# Patient Record
Sex: FEMALE | Race: OTHER | Employment: UNEMPLOYED | URBAN - METROPOLITAN AREA
[De-identification: names, ages, dates, MRNs, and addresses within clinical notes are randomized per-mention and may not be internally consistent; named-entity substitution may affect disease eponyms.]

---

## 2017-09-19 ENCOUNTER — HOSPITAL ENCOUNTER (INPATIENT)
Age: 32
LOS: 2 days | Discharge: HOME | DRG: 754 | End: 2017-09-21
Attending: EMERGENCY MEDICINE | Admitting: PSYCHIATRY & NEUROLOGY
Payer: MEDICAID

## 2017-09-19 ENCOUNTER — HOSPITAL ENCOUNTER (EMERGENCY)
Age: 32
Discharge: HOME | DRG: 754 | End: 2017-09-19
Attending: EMERGENCY MEDICINE
Payer: MEDICAID

## 2017-09-19 VITALS
RESPIRATION RATE: 14 BRPM | HEART RATE: 66 BPM | DIASTOLIC BLOOD PRESSURE: 77 MMHG | SYSTOLIC BLOOD PRESSURE: 124 MMHG | TEMPERATURE: 98.1 F | WEIGHT: 110 LBS | OXYGEN SATURATION: 95 % | BODY MASS INDEX: 17.68 KG/M2 | HEIGHT: 66 IN

## 2017-09-19 DIAGNOSIS — F11.24 OPIOID DEPENDENCE WITH OPIOID-INDUCED MOOD DISORDER (HCC): ICD-10-CM

## 2017-09-19 DIAGNOSIS — F11.20 HEROIN ADDICTION (HCC): Primary | ICD-10-CM

## 2017-09-19 DIAGNOSIS — R45.851 SUICIDAL IDEATION: Primary | ICD-10-CM

## 2017-09-19 PROCEDURE — 99285 EMERGENCY DEPT VISIT HI MDM: CPT

## 2017-09-19 PROCEDURE — 99284 EMERGENCY DEPT VISIT MOD MDM: CPT

## 2017-09-19 PROCEDURE — 1240000000 HC EMOTIONAL WELLNESS R&B

## 2017-09-19 ASSESSMENT — SLEEP AND FATIGUE QUESTIONNAIRES
DIFFICULTY STAYING ASLEEP: YES
RESTFUL SLEEP: NO
DIFFICULTY ARISING: NO
DIFFICULTY FALLING ASLEEP: YES
DO YOU HAVE DIFFICULTY SLEEPING: YES
DO YOU USE A SLEEP AID: NO
AVERAGE NUMBER OF SLEEP HOURS: 3
SLEEP PATTERN: INSOMNIA

## 2017-09-19 ASSESSMENT — LIFESTYLE VARIABLES: HISTORY_ALCOHOL_USE: NO

## 2017-09-19 NOTE — ED NOTES
Pt and rn spoke with [facility]  Pt would need to have active insurance to be admitted there . RN called [facility] pt can come severo 0800 for a bed. RN also called [facility] and they have a bed available right now for pt . RN offered pt this option and offered to cab pt to [facility] pt refused to go .  DAYRON Jose aware and will d/c pt to home      [facility]  09/19/17 9087

## 2017-09-19 NOTE — ED NOTES
[facility] called states no beds available for this pt , will call [facility].      Pranav Guillen RN  09/19/17 2302

## 2017-09-19 NOTE — IP AVS SNAPSHOT
After Visit Summary    This summary was created for you. Thank you for entrusting your care to us. The following information includes details about your hospital/visit stay along with steps you should take to help with your recovery once you leave the hospital.  In this packet, you will find information about the topics listed below:    · Instructions about your medications including a list of your home medications  · A summary of your hospital visit  · Follow-up appointments once you have left the hospital  · Your care plan at home      You may receive a survey regarding the care you received during your stay. Your input is valuable to us. We encourage you to complete and return your survey in the envelope provided. We hope you will choose us in the future for your healthcare needs. Basic Information     Date Of Birth Sex Race Ethnicity Preferred Language    1985 Female  / English      Vital Signs     Blood Pressure Pulse Temperature Respirations Height Weight    103/67 111 98.2 °F (36.8 °C) (Oral) 12 5' 6\" (1.676 m) 123 lb (55.8 kg)    Last Menstrual Period Oxygen Saturation Body Mass Index Smoking Status          08/19/2017 (Exact Date) 100% 19.85 kg/m2 Current Every Day Smoker        Care Provided at:     Name Address Phone       [address] [facility] [phone]       Psychiatric Advance Directive          Most Recent Value    Psychiatric Advance Directive  No    Power of  for Chato Montano  none           Your Visit    Here you will find information about your visit, including the reason for your visit. Please take this sheet with you when you visit your doctor or other health care provider in the future. It will help determine the best possible medical care for you at that time. If you have any questions once you leave the hospital, please call the department phone number listed below.         Why you were here surgery. Follow any nutrition recommendations given to you during your hospital stay. ? If you were given an oral nutrition supplement while in the hospital, continue to take this supplement at home. You can take it with meals, in-between meals, and/or before bedtime. These supplements can be purchased at most local grocery stores, pharmacies, and chain [employer]-stores. ? If you have any questions about your diet or nutrition, call the hospital and ask for the dietitian. General diet           Activity Instructions     As tolerated           Procedures and Other Instructions     Please keep all follow up appointments. Take all medications as prescribed. Avoid alcohol and streets drugs. Discharge Instructions            Suicidal Thoughts and Behavior: Care Instructions  Your Care Instructions  You have been seen by a doctor because you've had thoughts about killing yourself. Maybe you have tried to do it. This is much different from having fleeting thoughts of death, which many people have from time to time. Your doctor and support team will work hard to help keep you safe. Your team may include a , a , and a counselor. Most people who think about suicide don't want to die. They think suicide will end their problems and pain. People who consider suicide often feel hopeless, helpless, and worthless. These thoughts can make a person feel that there is no other choice. But you do have a choice. Help is always available. The doctor and staff members are taking you and your pain very seriously. It is important to remember that there are people who are willing and able to talk with you about your suicidal thoughts. Treatment and close follow-up care can help you feel better about life. Thoughts of hopelessness and suicide may come from being depressed. Depression is a medical condition.  When you have depression, there may be problems with activity levels in certain parts of your brain. Chemicals in your brain called neurotransmitters may be out of balance. But depression can be treated. Treatment for depression includes counseling, medicines, and lifestyle changes. With treatment, you can feel better. Your doctor doesn't want you to hurt yourself. He or she may ask you to sign a \"no harm\" agreement or contract. This contract is your promise that you will not hurt yourself between now and your next visit. Be completely honest with your doctor if you feel that you can't sign it. You will get help. Follow-up care is a key part of your treatment and safety. Be sure to make and go to all appointments, and call your doctor if you are having problems. It's also a good idea to know your test results and keep a list of the medicines you take. How can you care for yourself at home? · Talk to someone. Reach out to family members, friends, your doctor, or a counselor. Be open and honest with them about your thoughts and feelings. · Be safe with medicines. Take your medicines exactly as prescribed. Call your doctor if you think you are having a problem with your medicine. · Avoid illegal drugs and alcohol. · Attend all counseling sessions recommended by your doctor. · Have someone take away sharp or dangerous objects, guns, and drugs from your home. · Keep the numbers for these national suicide hotlines: 3-245-123-TALK (4-659.929.6347) and 0-053-LBOPULJ (1-740.174.9164). When should you call for help? Call 911 anytime you think you may need emergency care. For example, call if:  · You feel you cannot stop from hurting yourself or someone else. Call your doctor now or seek immediate medical care if:  · You have one or more warning signs of suicide. For example, call if:  ¨ You feel like giving away your possessions. ¨ You use illegal drugs or drink alcohol heavily. ¨ You talk or write about death.  This may include writing suicide notes ? Review your discharge instructions provided by your caregivers at discharge    Certain functionality such as prescription refills, scheduling appointments or sending messages to your provider are not activated if your provider does not use [facility] in his/her office    For questions regarding your [facility] account call [phone]. If you have a clinical question, please call your doctor's office.

## 2017-09-19 NOTE — ED NOTES
Pt here requesting  admission for heroine addiction .  Pt would like to get into a detox program . Pt has no other complaints     Jessica Mcintyre RN  09/19/17 3021

## 2017-09-19 NOTE — ED AVS SNAPSHOT
After Visit Summary  (Discharge Instructions)    Medication List for Home    Based on the information you provided to us as well as any changes during this visit, the following is your updated medication list.  Compare this with your prescription bottles at home. If you have any questions or concerns, contact your primary care physician's office. Daily Medication List (This medication list can be shared with any Healthcare provider who is helping you manage your medications)      Notice     You have not been prescribed any medications. Allergies as of 2017     No Known Allergies      Immunizations as of 2017     No immunizations on file. After Visit Summary    This summary was created for you. Thank you for entrusting your care to us. The following information includes details about your hospital/visit stay along with steps you should take to help with your recovery once you leave the hospital.  In this packet, you will find information about the topics listed below:    · Instructions about your medications including a list of your home medications  · A summary of your hospital visit  · Follow-up appointments once you have left the hospital  · Your care plan at home      You may receive a survey regarding the care you received during your stay. Your input is valuable to us. We encourage you to complete and return your survey in the envelope provided. We hope you will choose us in the future for your healthcare needs. Patient Information     Patient Name СВЕТЛАНА Bentley       Care Provided at:     Name Address Phone       [address]  [address] [phone]            Your Visit    Here you will find information about your visit, including the reason for your visit.   Please take this sheet with you when you visit your doctor or 2. Click on the Sign Up Now link in the Sign In box. You will see the New Member Sign Up page. 3. Enter your [employer] Access Code exactly as it appears below. You will not need to use this code after youve completed the sign-up process. If you do not sign up before the expiration date, you must request a new code. [employer] Access Code: [email or web address]  Expires: 11/18/2017  4:06 PM    4. Enter your Social Security Number ([id number]) and Date of Birth (mm/dd/yyyy) as indicated and click Submit. You will be taken to the next sign-up page. 5. Create a [employer] ID. This will be your [employer] login ID and cannot be changed, so think of one that is secure and easy to remember. 6. Create a [employer] password. You can change your password at any time. 7. Enter your Password Reset Question and Answer. This can be used at a later time if you forget your password. 8. Enter your e-mail address. You will receive e-mail notification when new information is available in [address] 9. Click Sign Up. You can now view your medical record. Additional Information  If you have questions, please contact the physician practice where you receive care. Remember, [employer] is NOT to be used for urgent needs. For medical emergencies, dial 911. For questions regarding your [employer] account call [phone]. If you have a clinical question, please call your doctor's office. View your information online  ? Review your current list of  medications, immunization, and allergies. ? Review your future test results online . ? Review your discharge instructions provided by your caregivers at discharge    Certain functionality such as prescription refills, scheduling appointments or sending messages to your provider are not activated if your provider does not use [employer] in his/her office    For questions regarding your [employer] account call [phone]. If you have a clinical question, please call your doctor's office. The information on all pages of the After Visit Summary has been reviewed with me, the patient and/or responsible adult, by my health care provider(s). I had the opportunity to ask questions regarding this information. I understand I should dispose of my armband safely at home to protect my health information. A complete copy of the After Visit Summary has been given to me, the patient and/or responsible adult. Patient Signature/Responsible Adult: ___________________________________    Nurse Signature: ___________________________________  Resident/MLP Signature: ___________________________________  Attending Signature: ___________________________________    Date:____________Time:____________              Discharge Instructions            Learning About Heroin Use and Withdrawal  What is heroin use? Heroin is an illegal, highly addictive drug. It's an opioid, like some types of medicines that doctors prescribe to treat pain. Examples of prescribed opioids include hydrocodone, oxycodone, fentanyl, and morphine. But while a prescribed opioid has rules for legal use, heroin does not. Heroin that is sold on the street has no . The strength of each dose is not known. It is often mixed (cut) with other drugs, sugar, powdered milk, or other things. It may also be cut with poisons, such as strychnine. Often, heroin use starts with the casual misuse of a prescribed opioid. A person may then switch to heroin because of its lower cost, in spite of the greater danger of using it. A person who uses heroin often will start needing higher and higher doses of the drug to get the same effect. This is called tolerance. Using the drug often also means it will take more of the drug for the body to function and to feel normal. This is called physical dependence. A person who uses heroin daily can become dependent on it within a few weeks. Taking too much heroin can be dangerous.  An overdose may cause trouble heroin. Counseling can also help the person's friends and family. It can provide support and teach them how to give the help that the person needs. Follow-up care is a key part of your treatment and safety. Be sure to make and go to all appointments, and call your doctor if you are having problems. It's also a good idea to know your test results and keep a list of the medicines you take. Where can you learn more? Go to https://[employer].peyajairaeweb.LearnBoost. org and sign in to your [employer] account. Enter P277 in the [employer] box to learn more about \"Learning About Heroin Use and Withdrawal.\"     If you do not have an account, please click on the \"Sign Up Now\" link. Current as of: March 23, 2017  Content Version: 11.3  © 4831-3801 [employer]. Care instructions adapted under license by [facility] ([facility]). If you have questions about a medical condition or this instruction, always ask your healthcare professional. [place] [address] any warranty or liability for your use of this information.

## 2017-09-19 NOTE — ED PROVIDER NOTES
[facility] ED  Emergency Department  Independent Attestation     Pt Name: Nirav Mack  MRN: [id number]  [place] 1985  Date of evaluation: 9/74/98       Nirav Mack is a 36 y.o. female who presents with Addiction Problem      I was personally available for consultation in the Emergency Department. I have reviewed the chart and agree with the documentation as recorded by the [facility], including the assessment, treatment plan and disposition.     Orquidea Koenig DO  Attending Emergency Physician  [facility] ED      (Please note that portions of this note were completed with a voice recognition program.  Efforts were made to edit the dictations but occasionally words are mis-transcribed.)        Orquidea Koenig DO  09/19/17 1873
kg/m2  Constitutional:  Well developed   Eyes:  Pupils equal and readily reactive to light  HENT:  Atraumatic, external ears normal, nose normal, oropharynx moist. Neck- supple. Respiratory:  Clear to auscultation bilaterally with good air exchange, no W/R/R  Cardiovascular:  RRR with normal S1 and S2  Gastrointestinal/Abdomen:  Soft, NT.  BS present. Musculoskeletal:  No edema, no tenderness, no deformities. Back:  Normal ROM. Integument:  No rash. Neurologic:  Alert & oriented x 3, no focal deficits noted   Psychiatric:   Admits to heroin addiction, denies suicidal or homicidal ideation      DIAGNOSTIC RESULTS     EKG: All EKG's are interpreted by the Emergency Department Physician who either signs or Co-signs this chart in the absence of a cardiologist.  Not indicated    RADIOLOGY:   Reviewed the radiologist:  No orders to display     Not indicated      LABS:  Labs Reviewed - No data to display  Not indicated    EMERGENCY DEPARTMENT COURSE:   -------------------------  Nurse contacted multiple facilities, spent over 2 hours to get patient admitted to a rehab facility. After landing 3 areas that we'll admit her 1 that will take her immediately patient's left without discharge paperwork, states that she just wants Suboxone and walks out. No orders of the defined types were placed in this encounter. CONSULTS:  None      FINAL IMPRESSION      1. Heroin addiction [facility])          DISPOSITION/PLAN:  DISPOSITION Decision to Discharge      PATIENT REFERRED TO:  Loki [address].  [phone]  Go to      [address] [place] [id number]  [phone]    For worsening symptoms, or any other concern      DISCHARGE MEDICATIONS:  There are no discharge medications for this patient.       (Please note that portions of this note were completed with a voice recognition program.  Efforts were made to edit the dictations but occasionally words are

## 2017-09-20 LAB
ABSOLUTE EOS #: 0.1 K/UL (ref 0–0.4)
ABSOLUTE LYMPH #: 2.8 K/UL (ref 1–4.8)
ABSOLUTE MONO #: 0.5 K/UL (ref 0.1–1.3)
ALBUMIN SERPL-MCNC: 3.7 G/DL (ref 3.5–5.2)
ALBUMIN/GLOBULIN RATIO: ABNORMAL (ref 1–2.5)
ALP BLD-CCNC: 242 U/L (ref 35–104)
ALT SERPL-CCNC: 115 U/L (ref 5–33)
AMPHETAMINE SCREEN URINE: NEGATIVE
ANION GAP SERPL CALCULATED.3IONS-SCNC: 9 MMOL/L (ref 9–17)
AST SERPL-CCNC: 153 U/L
BARBITURATE SCREEN URINE: NEGATIVE
BASOPHILS # BLD: 1 %
BASOPHILS ABSOLUTE: 0 K/UL (ref 0–0.2)
BENZODIAZEPINE SCREEN, URINE: NEGATIVE
BILIRUB SERPL-MCNC: 0.24 MG/DL (ref 0.3–1.2)
BILIRUBIN URINE: NEGATIVE
BUN BLDV-MCNC: 11 MG/DL (ref 6–20)
BUN/CREAT BLD: ABNORMAL (ref 9–20)
BUPRENORPHINE URINE: ABNORMAL
CALCIUM SERPL-MCNC: 9 MG/DL (ref 8.6–10.4)
CANNABINOID SCREEN URINE: NEGATIVE
CHLORIDE BLD-SCNC: 103 MMOL/L (ref 98–107)
CO2: 27 MMOL/L (ref 20–31)
COCAINE METABOLITE, URINE: NEGATIVE
COLOR: YELLOW
COMMENT UA: NORMAL
CREAT SERPL-MCNC: 0.61 MG/DL (ref 0.5–0.9)
DIFFERENTIAL TYPE: NORMAL
EOSINOPHILS RELATIVE PERCENT: 2 %
GFR AFRICAN AMERICAN: >60 ML/MIN
GFR NON-AFRICAN AMERICAN: >60 ML/MIN
GFR SERPL CREATININE-BSD FRML MDRD: ABNORMAL ML/MIN/{1.73_M2}
GFR SERPL CREATININE-BSD FRML MDRD: ABNORMAL ML/MIN/{1.73_M2}
GLUCOSE BLD-MCNC: 99 MG/DL (ref 70–99)
GLUCOSE URINE: NEGATIVE
HCT VFR BLD CALC: 36.5 % (ref 36–46)
HEMOGLOBIN: 12.4 G/DL (ref 12–16)
KETONES, URINE: NEGATIVE
LEUKOCYTE ESTERASE, URINE: NEGATIVE
LYMPHOCYTES # BLD: 42 %
MCH RBC QN AUTO: 29.5 PG (ref 26–34)
MCHC RBC AUTO-ENTMCNC: 33.9 G/DL (ref 31–37)
MCV RBC AUTO: 86.8 FL (ref 80–100)
MDMA URINE: ABNORMAL
METHADONE SCREEN, URINE: NEGATIVE
METHAMPHETAMINE, URINE: ABNORMAL
MONOCYTES # BLD: 8 %
NITRITE, URINE: NEGATIVE
OPIATES, URINE: POSITIVE
OXYCODONE SCREEN URINE: NEGATIVE
PDW BLD-RTO: 13.5 % (ref 11.5–14.9)
PH UA: 7.5 (ref 5–8)
PHENCYCLIDINE, URINE: NEGATIVE
PLATELET # BLD: 269 K/UL (ref 150–450)
PLATELET ESTIMATE: NORMAL
PMV BLD AUTO: 7.1 FL (ref 6–12)
POTASSIUM SERPL-SCNC: 4.2 MMOL/L (ref 3.7–5.3)
PROPOXYPHENE, URINE: ABNORMAL
PROTEIN UA: NEGATIVE
RBC # BLD: 4.2 M/UL (ref 4–5.2)
RBC # BLD: NORMAL 10*6/UL
SEG NEUTROPHILS: 47 %
SEGMENTED NEUTROPHILS ABSOLUTE COUNT: 3.1 K/UL (ref 1.3–9.1)
SODIUM BLD-SCNC: 139 MMOL/L (ref 135–144)
SPECIFIC GRAVITY UA: 1.01 (ref 1–1.03)
TEST INFORMATION: ABNORMAL
TOTAL PROTEIN: 7.9 G/DL (ref 6.4–8.3)
TRICYCLIC ANTIDEPRESSANTS, UR: ABNORMAL
TURBIDITY: CLEAR
URINE HGB: NEGATIVE
UROBILINOGEN, URINE: NORMAL
WBC # BLD: 6.6 K/UL (ref 3.5–11)
WBC # BLD: NORMAL 10*3/UL

## 2017-09-20 PROCEDURE — 85025 COMPLETE CBC W/AUTO DIFF WBC: CPT

## 2017-09-20 PROCEDURE — 6370000000 HC RX 637 (ALT 250 FOR IP): Performed by: PSYCHIATRY & NEUROLOGY

## 2017-09-20 PROCEDURE — 80053 COMPREHEN METABOLIC PANEL: CPT

## 2017-09-20 PROCEDURE — 80307 DRUG TEST PRSMV CHEM ANLYZR: CPT

## 2017-09-20 PROCEDURE — 81003 URINALYSIS AUTO W/O SCOPE: CPT

## 2017-09-20 PROCEDURE — 1240000000 HC EMOTIONAL WELLNESS R&B

## 2017-09-20 PROCEDURE — 36415 COLL VENOUS BLD VENIPUNCTURE: CPT

## 2017-09-20 PROCEDURE — 6360000002 HC RX W HCPCS: Performed by: PSYCHIATRY & NEUROLOGY

## 2017-09-20 RX ORDER — PROMETHAZINE HYDROCHLORIDE 25 MG/1
25 TABLET ORAL EVERY 4 HOURS PRN
Status: DISCONTINUED | OUTPATIENT
Start: 2017-09-20 | End: 2017-09-21 | Stop reason: HOSPADM

## 2017-09-20 RX ORDER — PROMETHAZINE HYDROCHLORIDE 25 MG/ML
6.25 INJECTION, SOLUTION INTRAMUSCULAR; INTRAVENOUS EVERY 4 HOURS PRN
Status: DISCONTINUED | OUTPATIENT
Start: 2017-09-20 | End: 2017-09-20

## 2017-09-20 RX ORDER — DIPHENHYDRAMINE HCL 25 MG
25 TABLET ORAL NIGHTLY PRN
Status: DISCONTINUED | OUTPATIENT
Start: 2017-09-20 | End: 2017-09-21 | Stop reason: HOSPADM

## 2017-09-20 RX ORDER — PROMETHAZINE HYDROCHLORIDE 25 MG/1
25 TABLET ORAL EVERY 4 HOURS PRN
Status: DISCONTINUED | OUTPATIENT
Start: 2017-09-20 | End: 2017-09-20

## 2017-09-20 RX ORDER — DICYCLOMINE HCL 20 MG
20 TABLET ORAL 4 TIMES DAILY PRN
Status: DISCONTINUED | OUTPATIENT
Start: 2017-09-20 | End: 2017-09-21 | Stop reason: HOSPADM

## 2017-09-20 RX ORDER — ACETAMINOPHEN 325 MG/1
650 TABLET ORAL EVERY 4 HOURS PRN
Status: DISCONTINUED | OUTPATIENT
Start: 2017-09-20 | End: 2017-09-21 | Stop reason: HOSPADM

## 2017-09-20 RX ORDER — IBUPROFEN 800 MG/1
800 TABLET ORAL 3 TIMES DAILY PRN
Status: DISCONTINUED | OUTPATIENT
Start: 2017-09-20 | End: 2017-09-21 | Stop reason: HOSPADM

## 2017-09-20 RX ORDER — TRAZODONE HYDROCHLORIDE 50 MG/1
50 TABLET ORAL NIGHTLY PRN
Status: DISCONTINUED | OUTPATIENT
Start: 2017-09-20 | End: 2017-09-21 | Stop reason: HOSPADM

## 2017-09-20 RX ORDER — CYCLOBENZAPRINE HCL 10 MG
10 TABLET ORAL 3 TIMES DAILY PRN
Status: DISCONTINUED | OUTPATIENT
Start: 2017-09-20 | End: 2017-09-21 | Stop reason: HOSPADM

## 2017-09-20 RX ORDER — PROMETHAZINE HYDROCHLORIDE 25 MG/ML
12.5 INJECTION, SOLUTION INTRAMUSCULAR; INTRAVENOUS EVERY 4 HOURS PRN
Status: DISCONTINUED | OUTPATIENT
Start: 2017-09-20 | End: 2017-09-21 | Stop reason: HOSPADM

## 2017-09-20 RX ORDER — MAGNESIUM HYDROXIDE/ALUMINUM HYDROXICE/SIMETHICONE 120; 1200; 1200 MG/30ML; MG/30ML; MG/30ML
20 SUSPENSION ORAL 3 TIMES DAILY PRN
Status: DISCONTINUED | OUTPATIENT
Start: 2017-09-20 | End: 2017-09-21 | Stop reason: HOSPADM

## 2017-09-20 RX ORDER — CLONIDINE HYDROCHLORIDE 0.1 MG/1
0.1 TABLET ORAL 3 TIMES DAILY
Status: DISCONTINUED | OUTPATIENT
Start: 2017-09-20 | End: 2017-09-21 | Stop reason: HOSPADM

## 2017-09-20 RX ORDER — BENZTROPINE MESYLATE 1 MG/ML
2 INJECTION INTRAMUSCULAR; INTRAVENOUS DAILY PRN
Status: DISCONTINUED | OUTPATIENT
Start: 2017-09-20 | End: 2017-09-21 | Stop reason: HOSPADM

## 2017-09-20 RX ORDER — HYDROXYZINE HYDROCHLORIDE 25 MG/1
25 TABLET, FILM COATED ORAL 3 TIMES DAILY PRN
Status: DISCONTINUED | OUTPATIENT
Start: 2017-09-20 | End: 2017-09-21 | Stop reason: HOSPADM

## 2017-09-20 RX ORDER — LOPERAMIDE HYDROCHLORIDE 2 MG/1
2 CAPSULE ORAL 4 TIMES DAILY PRN
Status: DISCONTINUED | OUTPATIENT
Start: 2017-09-20 | End: 2017-09-21 | Stop reason: HOSPADM

## 2017-09-20 RX ADMIN — PROMETHAZINE HYDROCHLORIDE 25 MG: 25 TABLET ORAL at 21:05

## 2017-09-20 RX ADMIN — NICOTINE POLACRILEX 2 MG: 2 GUM, CHEWING BUCCAL at 20:28

## 2017-09-20 RX ADMIN — CYCLOBENZAPRINE HYDROCHLORIDE 10 MG: 10 TABLET, FILM COATED ORAL at 16:17

## 2017-09-20 RX ADMIN — TRAZODONE HYDROCHLORIDE 50 MG: 50 TABLET ORAL at 21:05

## 2017-09-20 RX ADMIN — CLONIDINE HYDROCHLORIDE 0.1 MG: 0.1 TABLET ORAL at 21:06

## 2017-09-20 RX ADMIN — CLONIDINE HYDROCHLORIDE 0.1 MG: 0.1 TABLET ORAL at 08:58

## 2017-09-20 RX ADMIN — ACETAMINOPHEN 650 MG: 325 TABLET ORAL at 19:25

## 2017-09-20 RX ADMIN — LOPERAMIDE HYDROCHLORIDE 2 MG: 2 CAPSULE ORAL at 21:06

## 2017-09-20 RX ADMIN — CYCLOBENZAPRINE HYDROCHLORIDE 10 MG: 10 TABLET, FILM COATED ORAL at 08:58

## 2017-09-20 RX ADMIN — IBUPROFEN 800 MG: 800 TABLET, FILM COATED ORAL at 21:05

## 2017-09-20 RX ADMIN — IBUPROFEN 800 MG: 800 TABLET, FILM COATED ORAL at 13:19

## 2017-09-20 RX ADMIN — DIPHENHYDRAMINE HCL 25 MG: 25 TABLET ORAL at 21:06

## 2017-09-20 RX ADMIN — HYDROXYZINE HYDROCHLORIDE 25 MG: 25 TABLET, FILM COATED ORAL at 21:06

## 2017-09-20 RX ADMIN — CYCLOBENZAPRINE HYDROCHLORIDE 10 MG: 10 TABLET, FILM COATED ORAL at 21:06

## 2017-09-20 RX ADMIN — DICYCLOMINE HYDROCHLORIDE 20 MG: 20 TABLET ORAL at 21:05

## 2017-09-20 RX ADMIN — CLONIDINE HYDROCHLORIDE 0.1 MG: 0.1 TABLET ORAL at 13:19

## 2017-09-20 ASSESSMENT — SLEEP AND FATIGUE QUESTIONNAIRES
DIFFICULTY STAYING ASLEEP: YES
AVERAGE NUMBER OF SLEEP HOURS: 3
DIFFICULTY ARISING: NO
DO YOU USE A SLEEP AID: NO
DIFFICULTY STAYING ASLEEP: YES
RESTFUL SLEEP: NO
DO YOU HAVE DIFFICULTY SLEEPING: YES
SLEEP PATTERN: INSOMNIA;NIGHTMARES/TERRORS
DIFFICULTY FALLING ASLEEP: YES
DIFFICULTY ARISING: YES
AVERAGE NUMBER OF SLEEP HOURS: 3
SLEEP PATTERN: DIFFICULTY FALLING ASLEEP;INSOMNIA;DISTURBED/INTERRUPTED SLEEP;RESTLESSNESS
RESTFUL SLEEP: NO
DIFFICULTY FALLING ASLEEP: YES
DO YOU USE A SLEEP AID: NO
DO YOU HAVE DIFFICULTY SLEEPING: YES

## 2017-09-20 ASSESSMENT — ENCOUNTER SYMPTOMS
ABDOMINAL PAIN: 0
SHORTNESS OF BREATH: 0

## 2017-09-20 ASSESSMENT — LIFESTYLE VARIABLES
HISTORY_ALCOHOL_USE: NO
HISTORY_ALCOHOL_USE: NO

## 2017-09-20 ASSESSMENT — PATIENT HEALTH QUESTIONNAIRE - PHQ9
SUM OF ALL RESPONSES TO PHQ QUESTIONS 1-9: 15
SUM OF ALL RESPONSES TO PHQ QUESTIONS 1-9: 18

## 2017-09-20 ASSESSMENT — PAIN SCALES - GENERAL
PAINLEVEL_OUTOF10: 9
PAINLEVEL_OUTOF10: 8
PAINLEVEL_OUTOF10: 10
PAINLEVEL_OUTOF10: 8

## 2017-09-20 ASSESSMENT — PAIN DESCRIPTION - LOCATION: LOCATION: LEG

## 2017-09-20 NOTE — PLAN OF CARE
Problem: Altered Mood, Depressive Behavior  Goal: STG-Absence of Self Harm  Outcome: Ongoing  Patient admits to feelings of anxiety related to everything going on outside of the hospital with her kids and trying to get clean. Patient expressed wanting to be moved to a rehab facility so she can focus on getting sober and staying clean. Patient denies any feelings of depression currently, homicidal/suicidal ideations or audio/visual hallucinations.  Spontaneous safety and 15 minute checks maintained

## 2017-09-20 NOTE — PLAN OF CARE
Problem: Altered Mood, Depressive Behavior  Goal: LTG-Able to verbalize and/or display a decrease in depressive symptoms  Outcome: Ongoing  Patient admits to feelings of anxiety related to everything going on outside of the hospital with her kids and trying to get clean. Patient expressed wanting to be moved to a rehab facility so she can focus on getting sober and staying clean. Patient denies any feelings of depression currently, homicidal/suicidal ideations or audio/visual hallucinations.  Spontaneous safety and 15 minute checks maintained

## 2017-09-20 NOTE — PLAN OF CARE
Problem: Altered Mood, Depressive Behavior  Goal: LTG-Able to verbalize and/or display a decrease in depressive symptoms  Outcome: Ongoing  Psychoeducation Group Note     Date: 9/20/17              Start Time: 1000                    End Time: 1045     Number Participants in Group:  5     Goal:  Patient will demonstrate increased interpersonal interaction. Topic:  Self assessment     Discipline Responsible:    OT   AT   [facility]. X RT   Other         Participation Level:                  None x Minimal   x Active Listener   Interactive     Monopolizing             Participation Quality:  x Appropriate   Inappropriate   x       Attentive         Intrusive           Sharing         Resistant           Supportive         Lethargic         Affective:           Congruent   Incongruent   Blunted   Flat   x Constricted x Anxious   Elated   Angry     Labile   Depressed   Other   Bright         Cognitive:  x Alert x Oriented PPTP       Concentration   G x F   P   Attention Span   G x F   P   Short-Term Memory   G   F   P   Long-Term Memory   G   F   P   ProblemSolving/  Decision Making   G x F   P   Ability to Process  Information   G x F   P         Contributing Factors              Delusional              Hallucinating              Flight of Ideas              Other:         Modes of Intervention:  x Education   Support   Exploration   x Clarifying x Problem Solving x Confrontation   x Socialization   Limit Setting   Reality Testing     Activity   Movement   Media     Other:                  Response to Learning:  x Able to verbalize current knowledge/experience   x Able to verbalize/acknowledge new learning     Able to retain information     Capable of insight   x Able to change behavior   x Progressing to goal     Other:          Comments: pt attended group and participated.
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
## 2017-09-20 NOTE — BH NOTE
Patient was asked to provide a urine sample at their earliest convenience. Patient was provided urine cup at this time.

## 2017-09-20 NOTE — ED NOTES
Pt arrives to ED c/o suicidal thoughts. Pt states she has had these thoughts intermittent for the past 3 months. Pt currently says she is unsure if she has a plan or not. Pt states she uses 2 grams of IV heroin daily, if not more. Pt states her last use was early this morning/last last night.       Kendal Villalobos RN  09/19/17 6734

## 2017-09-20 NOTE — ED PROVIDER NOTES
[facility]  eMERGENCY dEPARTMENT eNCOUnter      Pt Name: Frank Frias  MRN: [id number]  [place] 1985  Date of evaluation: 9/19/17      CHIEF COMPLAINT       Chief Complaint   Patient presents with    Suicidal     HISTORY OF PRESENT ILLNESS   HPI 36 y.o. female presents with complaints of wanting to kill her self. The patient states that she's been thinking about killing herself because she is depressed about her persistent drug abuse she says she's been using heroin for the last 6 years and other narcotics for 10 years total.  She states that she has a history of previous suicide attempt and then she's been thinking about overdosing on pills for the last 2 months. She has a history of PTSD and anxiety in addition to her drug abuse. REVIEW OF SYSTEMS       Review of Systems   Constitutional: Positive for activity change and appetite change. Negative for chills and fever. HENT: Negative for congestion and mouth sores. Eyes: Negative for visual disturbance. Respiratory: Negative for shortness of breath. Cardiovascular: Negative for chest pain. Gastrointestinal: Negative for abdominal pain. Endocrine: Negative for polyuria. Genitourinary: Negative for dysuria and vaginal bleeding. Musculoskeletal: Negative for joint swelling. Skin: Negative for rash. Allergic/Immunologic: Negative for immunocompromised state. Neurological: Negative for headaches. Psychiatric/Behavioral: Positive for agitation, decreased concentration, dysphoric mood, self-injury, sleep disturbance and suicidal ideas. Negative for hallucinations. PAST MEDICAL HISTORY   History reviewed. No pertinent past medical history. SURGICAL HISTORY     History reviewed. No pertinent surgical history. CURRENT MEDICATIONS       There are no discharge medications for this patient. ALLERGIES     has No Known Allergies. FAMILY HISTORY     has no family status information on file.       SOCIAL HISTORY      reports that she has been smoking Cigarettes. She has a 10.00 pack-year smoking history. She has never used smokeless tobacco. She reports that she uses illicit drugs, including Opiates . She reports that she does not drink alcohol. PHYSICAL EXAM     INITIAL VITALS: /73  Pulse 90  Temp 97.8 °F (36.6 °C) (Oral)   Resp 18  Ht 5' 6\" (1.676 m)  Wt 123 lb (55.8 kg)  LMP 08/19/2017 (Exact Date)  SpO2 100%  BMI 19.85 kg/m2  GeN: NAD  Head: Normocephalic, atraumatic  Eye: Pupils equal round reactive to light, no conjunctivitis  Heart: Regular rate and rhythm no murmurs  Lungs: Clear to auscultation bilaterally, no respiratory distress  Chest wall: No crepitus, no tenderness palpation  Abdomen: Soft, nontender, nondistended, with no peritoneal signs  Neurologic: Patient is alert and oriented x3, motor and sensation is intact in all 4 extremities, cerebellar function is normal  Extremities: Needle injection sites to bilateral forearms, no induration or erythema or tenderness. MEDICAL DECISION MAKING:     MDM 29-year-old female presenting with depression and thoughts of suicide and a plan to overdose on pills. No evidence of any intoxication or acute active toxidrome  Patient's medically clear for psychiatric evaluation, the  will evaluate her discussed with the attending psychiatrist and I anticipate inpatient admission for treatment of her depression opiate dependence and suicidal thoughts.       DIAGNOSTIC RESULTS     EMERGENCY DEPARTMENT COURSE:   Vitals:    Vitals:    09/19/17 2102 09/20/17 0153   BP: 117/74 103/73   Pulse: 97 90   Resp: 17 18   Temp: 98 °F (36.7 °C) 97.8 °F (36.6 °C)   TempSrc: Oral Oral   SpO2: 98% 100%   Weight: 110 lb (49.9 kg) 123 lb (55.8 kg)   Height:  5' 6\" (1.676 m)       The patient was given the following medications while in the emergency department:  Orders Placed This Encounter   Medications    acetaminophen (TYLENOL) tablet 650 mg    magnesium hydroxide (MILK OF MAGNESIA) 400 MG/5ML suspension 30 mL    traZODone (DESYREL) tablet 50 mg    aluminum & magnesium hydroxide-simethicone (MAALOX) 200-200-20 MG/5ML suspension 20 mL    nicotine polacrilex (NICORETTE) gum 2 mg    benztropine mesylate (COGENTIN) injection 2 mg    hydrOXYzine (ATARAX) tablet 25 mg       -------------------------    Procedures     FINAL IMPRESSION      1. Suicidal ideation    2. Opioid dependence with opioid-induced mood disorder [facility])          DISPOSITION/PLAN   DISPOSITION Admitted    PATIENT REFERRED TO:  Angelina Wynn MD  [address]  [phone]            DISCHARGE MEDICATIONS:  There are no discharge medications for this patient.         Florence Narayanan MD  Attending Emergency Physician                      Florence Narayanan MD  09/20/17 0272

## 2017-09-20 NOTE — PROGRESS NOTES
Nutrition Assessment    Type and Reason for Visit: Positive Nutrition Screen (Unplanned weight loss and decreased appetite. Dietitian consult needed for poor appetite and weight loss)    Nutrition Recommendations: Continue General diet. Patient declined ONS at this time. Malnutrition Assessment:  · Malnutrition Status: Mild Malnutrition  · Context: Chronic illness  · Findings of the 6 clinical characteristics of malnutrition (Minimum of 2 out of 6 clinical characteristics is required to make the diagnosis of moderate or severe Protein Calorie Malnutrition based on AND/ASPEN Guidelines):  1. Energy Intake-Less than or equal to 50%, greater than 7 days    2. Weight Loss-7.5% loss or greater, unable to assess    Nutrition Diagnosis:   · Problem: Inadequate oral intake  · Etiology: related to Psychological cause/life stress (substance abuse)     Signs and symptoms:  as evidenced by Weight loss, Diet history of poor intake, Lab values    Nutrition Assessment:  · Subjective Assessment: Patient reports weight loss is from drug use. Patient states that her ususal weight fluctuates from 135-140 lbs. Patient reports she ate a few bites at breakfast this morning but she is starting to have withdrawal symptoms of diarrhea, nausea and abdominal cramping. Patient declined Ensure at this time because of nausea.    · Nutrition-Focused Physical Findings: diarrhea, abdominal cramping, nausea  · Wound Type: None  · Current Nutrition Therapies:  · Oral Diet Orders: General   · Oral Diet intake: 1-25%  · Anthropometric Measures:  · Ht: 5' 6\" (167.6 cm)   · Current Body Wt: 123 lb (55.8 kg)  · Admission Body Wt: 123 lb (55.8 kg)  · Usual Body Wt: 135 lb (61.2 kg)  · % Weight Change: 9.7%,  unknown  · Ideal Body Wt: 130 lb (59 kg), % Ideal Body 95%  · Adjusted Body Wt:  , body weight adjusted for    · BMI Classification: BMI 18.5 - 24.9 Normal Weight  · Comparative Standards (Estimated Nutrition Needs):  · Estimated Daily Total Kcal: 5910-8725  · Estimated Daily Protein (g): 47-59    Estimated Intake vs Estimated Needs: Intake Less Than Needs    Nutrition Risk Level: High    Nutrition Interventions:   Continue current diet  Continued Inpatient Monitoring    Nutrition Evaluation:   · Evaluation: Goals set   · Goals: PO intakes greater than 75% at meals    · Monitoring: Meal Intake, Weight, Diet Tolerance, Pertinent Labs, Nausea or Vomiting, Diarrhea    See Adult Nutrition Doc Flowsheet for more detail.      Bella SINGH RTalaD, L.D,  Clinical Dietitian  Pager # [phone]

## 2017-09-20 NOTE — BH NOTE
`[facility]  Admission Note     Admission Type:   Admission Type: Voluntary    Reason for admission:  Reason for Admission: SI to OD for the last 2 months. Daily heroin user. Lost custody of children. PATIENT STRENGTHS:  Strengths: Communication, Positive Support    Patient Strengths and Limitations:  Limitations: Inappropriate/potentially harmful leisure interests, Difficulty problem solving/relies on others to help solve problems    Addictive Behavior:   Addictive Behavior  In the past 3 months, have you felt or has someone told you that you have a problem with:  : Other(Comments) (heroin use)  Do you have a history of Chemical Use?: No  Do you have a history of Alcohol Use?: No  Do you have a history of Street Drug Abuse?: Yes  Histroy of Prescripton Drug Abuse?: No    Medical Problems:   History reviewed. No pertinent past medical history. Status EXAM:  Status and Exam  Normal: No  Facial Expression: Flat, Sad  Affect: Appropriate  Level of Consciousness: Alert  Mood:Normal: No  Mood: Depressed, Anxious, Worthless, low self-esteem, Helpless  Motor Activity:Normal: Yes  Interview Behavior: Cooperative  Preception: [place] to Person, [place]lette Dasen to Time, [place] to Place, [place] to Situation  Attention:Normal: Yes  Thought Processes: Circumstantial  Thought Content:Normal: Yes  Hallucinations: None  Delusions: No  Memory:Normal: Yes  Insight and Judgment: No  Insight and Judgment: Poor Judgment, Poor Insight  Present Suicidal Ideation: No  Present Homicidal Ideation: No    Tobacco Screening:  Practical Counseling, on admission, gilberto X, if applicable and completed (first 3 are required if patient doesn't refuse):             (x )  Recognizing danger situations (included triggers and roadblocks)                    (x )  Coping skills (new ways to manage stress, exercise, relaxation techniques, changing routine, distraction)                                                           (x )  Basic information about quitting (benefits of quitting, techniques in how to quit, available resources  ( ) Referral for counseling faxed to [facility]                                           ( ) Patient refused counseling  ( ) Patient has not smoked in the last 30 days      Pt admitted with followings belongings:  Dentures: None  Vision - Corrective Lenses: None  Hearing Aid: None  Jewelry: Earrings  Body Piercings Removed: No  Clothing: Footwear, Pants, Shirt, Socks, Undergarments (Comment), Pajamas  Were All Patient Medications Collected?: Not Applicable  Other Valuables: Purse     Valuables placed in safe in security envelope, number: [id number]. Patient oriented to surroundings and program expectations and copy of patient rights given. Received admission packet:  Yes. Consents reviewed, signed: Yes. Patient verbalize understanding of need for current admission. Patient education on precautions of [facility].                   Iesha Sullivan RN

## 2017-09-20 NOTE — PLAN OF CARE
Problem: Altered Mood, Depressive Behavior  Goal: LTG-Able to verbalize and/or display a decrease in depressive symptoms  Outcome: Ongoing  [facility]  Initial Interdisciplinary Treatment Plan NO        Original treatment plan Date & Time: 9/20/17 0916     Admission Type:  Admission Type: Voluntary     Reason for admission:   Reason for Admission: SI to OD for the last 2 months. Daily heroin user. Lost custody of children. Estimated Length of Stay:  5-7days  Estimated Discharge Date: to be determined by physician     PATIENT STRENGTHS:  Patient Strengths:Strengths: Communication, Positive Support  Patient Strengths and Limitations:Limitations: Inappropriate/potentially harmful leisure interests, Difficulty problem solving/relies on others to help solve problems  Addictive Behavior: Addictive Behavior  In the past 3 months, have you felt or has someone told you that you have a problem with:  : Other(Comments) (heroin use)  Do you have a history of Chemical Use?: No  Do you have a history of Alcohol Use?: No  Do you have a history of Street Drug Abuse?: Yes  Histroy of Prescripton Drug Abuse?: No  Medical Problems:History reviewed. No pertinent past medical history.   Status EXAM:Status and Exam  Normal: No  Facial Expression: Flat, Sad  Affect: Appropriate  Level of Consciousness: Alert  Mood:Normal: No  Mood: Depressed, Anxious, Worthless, low self-esteem, Helpless  Motor Activity:Normal: Yes  Interview Behavior: Cooperative  Preception: [place] to Person, [place]lin Maser to Time, [place] to Place, [place] to Situation  Attention:Normal: Yes  Thought Processes: Circumstantial  Thought Content:Normal: Yes  Hallucinations: None  Delusions: No  Memory:Normal: Yes  Insight and Judgment: No  Insight and Judgment: Poor Judgment, Poor Insight  Present Suicidal Ideation: No  Present Homicidal Ideation: No     EDUCATION:   Learner Progress Toward Treatment Goals: reviewed group plans and strategies for care Method:group therapy, medication compliance, individualized assessments and care planning     Outcome: needs reinforcement     PATIENT GOALS: to be discussed with patient within 72 hours     PLAN/TREATMENT RECOMMENDATIONS:      continue group therapy , medications compliance, goal setting, individualized assessments and care, continue to monitor pt on unit        SHORT-TERM GOALS:   Time frame for Short-Term Goals: 5-7 days     LONG-TERM GOALS:  Time frame for Long-Term Goals: 6 months  Members Present in Team Meeting: See Signature Sheet     Richard Pitt, [address]

## 2017-09-20 NOTE — PROGRESS NOTES
Writer met with Pt per SW request.  PT shared that she had been using drugs for 10 years and recently had her children taken away. Pt share that not having insurance was a barrier to getting help and reported that the [facility] already aided her in applying for Medicaid. PT and Writer discussed different options and Pt shared that she was looking for medications that would make her feel better and seemed to be fixated on leaving soon. Writer shared for Pt to follow up with MD.  Pt shared that she lived in [place] and wanted outpatient treatment. Writer shared about [facility] and Pt signed JASON. PT shared that she had been to [facility] before to attend outside meetings. Writer met with SW and SW shared that PT would be discharged to [address] [place] [address] for opiate detox in the morning.

## 2017-09-20 NOTE — ED NOTES
Safeguard in room for patient watch. Safeguard informed that they need to stay with the patient at all time, must be present in the room and if they need a break or relief to let the nurse know so they can be replaced. Safeguard verbalizes understanding. Belongings and patient checked by security. Belongings locked up. Pt in blue gown.        Newton Carlson RN  09/19/17 4760

## 2017-09-21 VITALS
HEART RATE: 111 BPM | RESPIRATION RATE: 12 BRPM | HEIGHT: 66 IN | OXYGEN SATURATION: 100 % | BODY MASS INDEX: 19.77 KG/M2 | WEIGHT: 123 LBS | DIASTOLIC BLOOD PRESSURE: 67 MMHG | SYSTOLIC BLOOD PRESSURE: 103 MMHG | TEMPERATURE: 98.2 F

## 2017-09-21 PROCEDURE — 6370000000 HC RX 637 (ALT 250 FOR IP): Performed by: PSYCHIATRY & NEUROLOGY

## 2017-09-21 PROCEDURE — 5130000000 HC BRIDGE APPOINTMENT: Performed by: COUNSELOR

## 2017-09-21 RX ADMIN — CLONIDINE HYDROCHLORIDE 0.1 MG: 0.1 TABLET ORAL at 14:02

## 2017-09-21 RX ADMIN — CYCLOBENZAPRINE HYDROCHLORIDE 10 MG: 10 TABLET, FILM COATED ORAL at 08:30

## 2017-09-21 RX ADMIN — CYCLOBENZAPRINE HYDROCHLORIDE 10 MG: 10 TABLET, FILM COATED ORAL at 16:55

## 2017-09-21 RX ADMIN — CLONIDINE HYDROCHLORIDE 0.1 MG: 0.1 TABLET ORAL at 08:30

## 2017-09-21 ASSESSMENT — PAIN SCALES - GENERAL: PAINLEVEL_OUTOF10: 7

## 2017-09-21 ASSESSMENT — PAIN DESCRIPTION - LOCATION: LOCATION: GENERALIZED

## 2017-09-21 NOTE — PLAN OF CARE
Problem: Altered Mood, Depressive Behavior  Goal: LTG-Able to verbalize and/or display a decrease in depressive symptoms  Outcome: Ongoing  Therapeutic Recreation Refusal  Pt did not participate in walking/stretching/socilizing at 1430 despite staff encouragement.

## 2017-09-21 NOTE — PLAN OF CARE
Problem: Altered Mood, Depressive Behavior  Goal: STG-Absence of Self Harm  Patient did not attend goal setting and community meeting from [phone] despite staff encouragement and prompts.

## 2017-09-21 NOTE — PLAN OF CARE
Problem: Altered Mood, Depressive Behavior  Goal: LTG-Able to verbalize and/or display a decrease in depressive symptoms  Outcome: Ongoing  Patient declined to attend 10:00 am psychotherapy group even when encouraged by clinician.

## 2017-09-21 NOTE — PLAN OF CARE
Problem: Altered Mood, Depressive Behavior  Goal: LTG-Able to verbalize and/or display a decrease in depressive symptoms  Outcome: Ongoing  Goal: STG-Absence of Self Harm  Outcome: Ongoing  Pt is calm, controlled upon approach, cooperative with treatment. Pt attends groups, is social and pleasant within the milieu. Pt is med compliant, completes ADLs without prompting, and reports good sleep patterns as well as a good appetite. Denies SI, HI, AVH at this time and relates that she feels ready for discharge. Q15min checks maintained until departure.

## 2017-09-21 NOTE — BH NOTE
Patient given tobacco quitline number 86445646388 at this time, refusing to call at this time, states \" I just dont want to quit now\"- patient given information as to the dangers of long term tobacco use. Continue to reinforce the importance of tobacco cessation.  Lisbeth Power, RN

## 2017-09-21 NOTE — DISCHARGE INSTR - DIET

## 2017-09-21 NOTE — PLAN OF CARE
Problem: Altered Mood, Depressive Behavior  Goal: LTG-Able to verbalize and/or display a decrease in depressive symptoms  Outcome: Ongoing  [facility]  Day 3 Interdisciplinary Treatment Plan NOTE     Review Date & Time: 9/21/17 1339     Admission Type:   Admission Type: Voluntary     Reason for admission:  Reason for Admission: SI to OD for the last 2 months. Daily heroin user. Lost custody of children. Estimated Length of Stay: 5-7 days  Estimated Discharge Date Update: to be determined by physician     PATIENT STRENGTHS:  Patient Strengths Strengths: Communication, Positive Support  Patient Strengths and Limitations:Limitations: Lacks leisure interests, Hopeless about future, Multiple barriers to leisure interests, External locus of control, Tendency to isolate self, Inappropriate/potentially harmful leisure interests, Difficult relationships / poor social skills, Difficulty problem solving/relies on others to help solve problems  Addictive Behavior:Addictive Behavior  In the past 3 months, have you felt or has someone told you that you have a problem with:  : None  Do you have a history of Chemical Use?: No  Do you have a history of Alcohol Use?: No  Do you have a history of Street Drug Abuse?: Yes  Histroy of Prescripton Drug Abuse?: No  Medical Problems:History reviewed. No pertinent past medical history.      Risk:  Fall RiskTotal: 75  Anthony Scale Anthony Scale Score: 22  BVC Total: 0  Change in scores no Changes to plan of Care no     Status EXAM:   Status and Exam  Normal: No  Facial Expression: Avoids Gaze  Affect: Unstable  Level of Consciousness: Alert  Mood:Normal: No  Mood: Anxious  Motor Activity:Normal: No  Motor Activity: Agitated, Increased  Interview Behavior: Cooperative, Evasive  Preception: [place] to Person, [place] to Time, [place] to Place, [place] to Situation  Attention:Normal: No  Attention: Unable to Concentrate, Distractible  Thought Processes: Blocking,

## 2017-09-21 NOTE — PLAN OF CARE
Problem: Altered Mood, Depressive Behavior  Goal: LTG-Able to verbalize and/or display a decrease in depressive symptoms  Outcome: Ongoing  Therapeutic Recreation Refusal  Pt did not participate in creative expressions at 1100 despite staff encouragement.

## 2017-09-28 NOTE — PROGRESS NOTES
Presenting Evaluation:  Nicole Gotti is a 36 y.o. female who was admitted from the ED with complaint of SI to overdose. Today, she tells me that her and significant other thought that they could come in and get Suboxone by lying and verbalizing suicidality. She reports that she is frustrated with continued heroin use but has her own ideas of how she plans to achieve sobriety. She denies pervasive depressive symptoms today and adamantly denies having suicidal thoughts, even prior to admission. Denied symptoms consistent with episodes of gita/hypomania. Denied hallucinations or paranoia. Diagnostic Impression     Opiate dependence. No evidence of biologic mood illness      After discussion with patient about potential risks, benefits, side effects, decided to treat withdrawal and symptomatically basis. She was not started on any psychotropic medications due to lack of biologic symptoms indicating benefit of psychotropic medications. She was monitored for multiple consecutive days without any indication patient posed an imminent risk to herself or others. Patient was very open in discussing her motivation coming to the hospital.  She was doing fairly well at the time of discharge and was not in any distress. Thought process was organized and showed insight into compliance with treatment. Patient had been making rational and realistic plans so she was discharged. Patient had been bright, reactive, and interacting appropriately with staff and peers. There had been multiple consecutive days without any thoughts of suicide or other safety concerns. She will follow up at [facility]. Medication List      Notice     You have not been prescribed any medications.